# Patient Record
Sex: FEMALE | Race: WHITE | NOT HISPANIC OR LATINO
[De-identification: names, ages, dates, MRNs, and addresses within clinical notes are randomized per-mention and may not be internally consistent; named-entity substitution may affect disease eponyms.]

---

## 2019-03-21 ENCOUNTER — APPOINTMENT (OUTPATIENT)
Dept: OBGYN | Facility: CLINIC | Age: 79
End: 2019-03-21
Payer: MEDICARE

## 2019-03-21 VITALS
BODY MASS INDEX: 26.52 KG/M2 | HEIGHT: 66 IN | SYSTOLIC BLOOD PRESSURE: 146 MMHG | WEIGHT: 165 LBS | DIASTOLIC BLOOD PRESSURE: 90 MMHG

## 2019-03-21 DIAGNOSIS — Z01.419 ENCOUNTER FOR GYNECOLOGICAL EXAMINATION (GENERAL) (ROUTINE) W/OUT ABNORMAL FINDINGS: ICD-10-CM

## 2019-03-21 PROCEDURE — G0101: CPT

## 2019-03-21 PROCEDURE — 99203 OFFICE O/P NEW LOW 30 MIN: CPT | Mod: 25

## 2019-03-21 NOTE — PHYSICAL EXAM
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

## 2019-03-26 LAB — CYTOLOGY CVX/VAG DOC THIN PREP: NORMAL

## 2020-02-07 ENCOUNTER — APPOINTMENT (OUTPATIENT)
Dept: OBGYN | Facility: CLINIC | Age: 80
End: 2020-02-07
Payer: MEDICARE

## 2020-02-07 VITALS
HEART RATE: 77 BPM | HEIGHT: 66 IN | SYSTOLIC BLOOD PRESSURE: 139 MMHG | DIASTOLIC BLOOD PRESSURE: 86 MMHG | WEIGHT: 171 LBS | BODY MASS INDEX: 27.48 KG/M2

## 2020-02-07 PROCEDURE — 99213 OFFICE O/P EST LOW 20 MIN: CPT

## 2020-02-07 RX ORDER — CONJUGATED ESTROGENS 0.62 MG/G
0.62 CREAM VAGINAL
Qty: 1 | Refills: 3 | Status: ACTIVE | COMMUNITY
Start: 2020-02-07 | End: 1900-01-01

## 2020-02-07 NOTE — PHYSICAL EXAM
[Normal] : vagina [Cystocele] : a cystocele [No Bleeding] : there was no active vaginal bleeding [Absent] : absent [FreeTextEntry4] : vault prolapse

## 2020-02-25 ENCOUNTER — APPOINTMENT (OUTPATIENT)
Age: 80
End: 2020-02-25
Payer: MEDICARE

## 2020-02-25 VITALS
WEIGHT: 170 LBS | HEIGHT: 66 IN | SYSTOLIC BLOOD PRESSURE: 179 MMHG | BODY MASS INDEX: 27.32 KG/M2 | DIASTOLIC BLOOD PRESSURE: 95 MMHG

## 2020-02-25 DIAGNOSIS — N81.9 FEMALE GENITAL PROLAPSE, UNSPECIFIED: ICD-10-CM

## 2020-02-25 DIAGNOSIS — Z86.018 PERSONAL HISTORY OF OTHER BENIGN NEOPLASM: ICD-10-CM

## 2020-02-25 DIAGNOSIS — R35.1 NOCTURIA: ICD-10-CM

## 2020-02-25 DIAGNOSIS — Z87.828 PERSONAL HISTORY OF OTHER (HEALED) PHYSICAL INJURY AND TRAUMA: ICD-10-CM

## 2020-02-25 DIAGNOSIS — Z86.59 PERSONAL HISTORY OF OTHER MENTAL AND BEHAVIORAL DISORDERS: ICD-10-CM

## 2020-02-25 DIAGNOSIS — N81.4 UTEROVAGINAL PROLAPSE, UNSPECIFIED: ICD-10-CM

## 2020-02-25 DIAGNOSIS — N36.41 HYPERMOBILITY OF URETHRA: ICD-10-CM

## 2020-02-25 LAB
BILIRUB UR QL STRIP: NEGATIVE
CLARITY UR: CLEAR
COLLECTION METHOD: NORMAL
GLUCOSE UR-MCNC: NEGATIVE
HCG UR QL: 0.2 EU/DL
HGB UR QL STRIP.AUTO: NEGATIVE
KETONES UR-MCNC: NEGATIVE
LEUKOCYTE ESTERASE UR QL STRIP: NEGATIVE
NITRITE UR QL STRIP: NEGATIVE
PH UR STRIP: 7
PROT UR STRIP-MCNC: NEGATIVE
SP GR UR STRIP: 1.02

## 2020-02-25 PROCEDURE — 99215 OFFICE O/P EST HI 40 MIN: CPT

## 2020-02-25 PROCEDURE — 51701 INSERT BLADDER CATHETER: CPT

## 2020-02-25 PROCEDURE — 81003 URINALYSIS AUTO W/O SCOPE: CPT | Mod: QW

## 2020-02-25 PROCEDURE — 99204 OFFICE O/P NEW MOD 45 MIN: CPT

## 2020-02-25 RX ORDER — ESCITALOPRAM OXALATE 5 MG/1
5 TABLET ORAL
Qty: 30 | Refills: 0 | Status: ACTIVE | COMMUNITY
Start: 2019-11-08

## 2020-02-25 RX ORDER — SODIUM SULFATE, POTASSIUM SULFATE, MAGNESIUM SULFATE 17.5; 3.13; 1.6 G/ML; G/ML; G/ML
17.5-3.13-1.6 SOLUTION, CONCENTRATE ORAL
Qty: 354 | Refills: 0 | Status: ACTIVE | COMMUNITY
Start: 2020-02-04

## 2020-02-25 RX ORDER — AMOXICILLIN 500 MG/1
500 TABLET, FILM COATED ORAL
Qty: 20 | Refills: 0 | Status: ACTIVE | COMMUNITY
Start: 2019-09-06

## 2020-02-25 NOTE — OB HISTORY
[Definite ___ (Date)] : the last menstrual period was [unfilled] [Vaginal ___] : [unfilled] vaginal delivery(s) [Last Pap Smear ___] : date of last pap smear was on [unfilled] [Sexually Active] : is not sexually active [Regular Cycle Intervals] : periods have been irregular

## 2020-02-25 NOTE — HISTORY OF PRESENT ILLNESS
[FreeTextEntry1] : \par 78 y/o presents with vaginal bulge X 1 yr, becoming increasing bothersome, denies vaginal bleeding, denies splinting, denies incontinence, feels like she is sitting on a ball, denies frequency, denies urgency, denies nocturia, denies slow stream, denies incomplete emptying, denies constipation, denies UTIs, denies hematuria, denies fecal incontinence\par she was given estrace cream by Dr. Cat, did not use \par \par PSH:  hysterectomy for fibroids, laparoscopic cholecystectomy\par

## 2020-02-25 NOTE — PHYSICAL EXAM
[Well developed] : well developed [No Acute Distress] : in no acute distress [Well Nourished] : ~L well nourished [Oriented x3] : oriented to person, place, and time [Normal Mood/Affect] : mood and affect are normal [Normal Memory] : ~T memory was ~L unimpaired [No Edema] : ~T edema was not present [Suprapubic] : in the suprapubic area [Scar] : a scar was noted [Vulvar Atrophy] : vulvar atrophy [Labia Majora] : were normal [Labia Minora] : were normal [No Bleeding] : there was no active vaginal bleeding [Atrophy] : atrophy [2] : 2 [Aa ____] : Aa [unfilled] [GH ____] : GH [unfilled] [Ba ____] : Ba [unfilled] [C ____] : C [unfilled] [TVL ____] : TVL  [unfilled] [PB ____] : PB [unfilled] [Bp ____] : Bp [unfilled] [Ap ____] : Ap [unfilled] [D ____] : D [unfilled] [Post Void Residual ____ml] : post void residual via catheterization was [unfilled] ml [Normal] : no abnormalities [Adnexa Absent] : absent bilaterally [Absent] : absent [Cough] : no cough [Tenderness] : ~T no ~M abdominal tenderness observed [Distended] : not distended [FreeTextEntry3] : neg CST

## 2020-02-25 NOTE — DISCUSSION/SUMMARY
[FreeTextEntry1] : \par Mary presents with post hysterectomy vaginal vault, elevated PVR on exam today, negative CST. Visual illustrations were used to demonstrate prolapse. We reviewed management options for her prolapse including: observation, pelvic floor exercises with and without physical therapy, pessary, and surgical management. We reviewed the different types of surgical procedures including abdominal, vaginal, and robotic/laparoscopic procedures.  Mesh and non-mesh options were reviewed. IUGA information on prolapse and PFE was given to her. She is interested in robotic sacral colpopexy. U/A negative. Recommend UDS and cysto. All questions were answered.\par \par [] UDS\par [] cysto\par [] discussed using estrace cream given by Dr. Cat\par [] double voiding\par [] anticipate robotic sacral colpopexy, cysto, possible sling pending UDS\par \par

## 2020-03-18 ENCOUNTER — APPOINTMENT (OUTPATIENT)
Dept: UROGYNECOLOGY | Facility: CLINIC | Age: 80
End: 2020-03-18

## 2020-03-26 ENCOUNTER — APPOINTMENT (OUTPATIENT)
Dept: UROGYNECOLOGY | Facility: CLINIC | Age: 80
End: 2020-03-26

## 2020-09-15 ENCOUNTER — EMERGENCY (EMERGENCY)
Facility: HOSPITAL | Age: 80
LOS: 1 days | Discharge: HOME | End: 2020-09-15
Attending: EMERGENCY MEDICINE
Payer: MEDICARE

## 2020-09-15 VITALS
RESPIRATION RATE: 18 BRPM | HEIGHT: 65 IN | WEIGHT: 164.91 LBS | DIASTOLIC BLOOD PRESSURE: 86 MMHG | SYSTOLIC BLOOD PRESSURE: 186 MMHG | HEART RATE: 98 BPM | OXYGEN SATURATION: 95 % | TEMPERATURE: 101 F

## 2020-09-15 LAB
ALBUMIN SERPL ELPH-MCNC: 4 G/DL — SIGNIFICANT CHANGE UP (ref 3.3–5.2)
ALP SERPL-CCNC: 77 U/L — SIGNIFICANT CHANGE UP (ref 40–120)
ALT FLD-CCNC: 13 U/L — SIGNIFICANT CHANGE UP
ANION GAP SERPL CALC-SCNC: 14 MMOL/L — SIGNIFICANT CHANGE UP (ref 5–17)
APPEARANCE UR: CLEAR — SIGNIFICANT CHANGE UP
APTT BLD: 29.2 SEC — SIGNIFICANT CHANGE UP (ref 27.5–35.5)
AST SERPL-CCNC: 19 U/L — SIGNIFICANT CHANGE UP
BACTERIA # UR AUTO: ABNORMAL
BASOPHILS # BLD AUTO: 0.03 K/UL — SIGNIFICANT CHANGE UP (ref 0–0.2)
BASOPHILS NFR BLD AUTO: 0.4 % — SIGNIFICANT CHANGE UP (ref 0–2)
BILIRUB SERPL-MCNC: 0.4 MG/DL — SIGNIFICANT CHANGE UP (ref 0.4–2)
BILIRUB UR-MCNC: NEGATIVE — SIGNIFICANT CHANGE UP
BUN SERPL-MCNC: 7 MG/DL — LOW (ref 8–20)
CALCIUM SERPL-MCNC: 9.4 MG/DL — SIGNIFICANT CHANGE UP (ref 8.6–10.2)
CHLORIDE SERPL-SCNC: 100 MMOL/L — SIGNIFICANT CHANGE UP (ref 98–107)
CO2 SERPL-SCNC: 23 MMOL/L — SIGNIFICANT CHANGE UP (ref 22–29)
COLOR SPEC: YELLOW — SIGNIFICANT CHANGE UP
CREAT SERPL-MCNC: 0.62 MG/DL — SIGNIFICANT CHANGE UP (ref 0.5–1.3)
DIFF PNL FLD: NEGATIVE — SIGNIFICANT CHANGE UP
EOSINOPHIL # BLD AUTO: 0.14 K/UL — SIGNIFICANT CHANGE UP (ref 0–0.5)
EOSINOPHIL NFR BLD AUTO: 1.9 % — SIGNIFICANT CHANGE UP (ref 0–6)
EPI CELLS # UR: ABNORMAL
GLUCOSE SERPL-MCNC: 99 MG/DL — SIGNIFICANT CHANGE UP (ref 70–99)
GLUCOSE UR QL: NEGATIVE MG/DL — SIGNIFICANT CHANGE UP
HCT VFR BLD CALC: 43.4 % — SIGNIFICANT CHANGE UP (ref 34.5–45)
HGB BLD-MCNC: 14.2 G/DL — SIGNIFICANT CHANGE UP (ref 11.5–15.5)
IMM GRANULOCYTES NFR BLD AUTO: 0.4 % — SIGNIFICANT CHANGE UP (ref 0–1.5)
INR BLD: 1.19 RATIO — HIGH (ref 0.88–1.16)
KETONES UR-MCNC: ABNORMAL
LACTATE BLDV-MCNC: 1.2 MMOL/L — SIGNIFICANT CHANGE UP (ref 0.5–2)
LEUKOCYTE ESTERASE UR-ACNC: ABNORMAL
LYMPHOCYTES # BLD AUTO: 0.95 K/UL — LOW (ref 1–3.3)
LYMPHOCYTES # BLD AUTO: 13 % — SIGNIFICANT CHANGE UP (ref 13–44)
MCHC RBC-ENTMCNC: 29.8 PG — SIGNIFICANT CHANGE UP (ref 27–34)
MCHC RBC-ENTMCNC: 32.7 GM/DL — SIGNIFICANT CHANGE UP (ref 32–36)
MCV RBC AUTO: 91.2 FL — SIGNIFICANT CHANGE UP (ref 80–100)
MONOCYTES # BLD AUTO: 0.63 K/UL — SIGNIFICANT CHANGE UP (ref 0–0.9)
MONOCYTES NFR BLD AUTO: 8.6 % — SIGNIFICANT CHANGE UP (ref 2–14)
NEUTROPHILS # BLD AUTO: 5.55 K/UL — SIGNIFICANT CHANGE UP (ref 1.8–7.4)
NEUTROPHILS NFR BLD AUTO: 75.7 % — SIGNIFICANT CHANGE UP (ref 43–77)
NITRITE UR-MCNC: NEGATIVE — SIGNIFICANT CHANGE UP
PH UR: 7 — SIGNIFICANT CHANGE UP (ref 5–8)
PLATELET # BLD AUTO: 259 K/UL — SIGNIFICANT CHANGE UP (ref 150–400)
POTASSIUM SERPL-MCNC: 3.7 MMOL/L — SIGNIFICANT CHANGE UP (ref 3.5–5.3)
POTASSIUM SERPL-SCNC: 3.7 MMOL/L — SIGNIFICANT CHANGE UP (ref 3.5–5.3)
PROT SERPL-MCNC: 7.6 G/DL — SIGNIFICANT CHANGE UP (ref 6.6–8.7)
PROT UR-MCNC: 15 MG/DL
PROTHROM AB SERPL-ACNC: 13.7 SEC — HIGH (ref 10.6–13.6)
RBC # BLD: 4.76 M/UL — SIGNIFICANT CHANGE UP (ref 3.8–5.2)
RBC # FLD: 12.8 % — SIGNIFICANT CHANGE UP (ref 10.3–14.5)
RBC CASTS # UR COMP ASSIST: SIGNIFICANT CHANGE UP /HPF (ref 0–4)
SODIUM SERPL-SCNC: 137 MMOL/L — SIGNIFICANT CHANGE UP (ref 135–145)
SP GR SPEC: 1 — LOW (ref 1.01–1.02)
UROBILINOGEN FLD QL: 1 MG/DL
WBC # BLD: 7.33 K/UL — SIGNIFICANT CHANGE UP (ref 3.8–10.5)
WBC # FLD AUTO: 7.33 K/UL — SIGNIFICANT CHANGE UP (ref 3.8–10.5)
WBC UR QL: SIGNIFICANT CHANGE UP

## 2020-09-15 PROCEDURE — 93010 ELECTROCARDIOGRAM REPORT: CPT

## 2020-09-15 PROCEDURE — 71045 X-RAY EXAM CHEST 1 VIEW: CPT | Mod: 26

## 2020-09-15 PROCEDURE — 99218: CPT | Mod: GC

## 2020-09-15 RX ORDER — SODIUM CHLORIDE 9 MG/ML
2300 INJECTION INTRAMUSCULAR; INTRAVENOUS; SUBCUTANEOUS ONCE
Refills: 0 | Status: COMPLETED | OUTPATIENT
Start: 2020-09-15 | End: 2020-09-15

## 2020-09-15 RX ORDER — ACETAMINOPHEN 500 MG
650 TABLET ORAL ONCE
Refills: 0 | Status: COMPLETED | OUTPATIENT
Start: 2020-09-15 | End: 2020-09-15

## 2020-09-15 RX ORDER — CEFTRIAXONE 500 MG/1
1000 INJECTION, POWDER, FOR SOLUTION INTRAMUSCULAR; INTRAVENOUS ONCE
Refills: 0 | Status: COMPLETED | OUTPATIENT
Start: 2020-09-15 | End: 2020-09-15

## 2020-09-15 RX ORDER — AZITHROMYCIN 500 MG/1
500 TABLET, FILM COATED ORAL ONCE
Refills: 0 | Status: COMPLETED | OUTPATIENT
Start: 2020-09-15 | End: 2020-09-15

## 2020-09-15 RX ORDER — IBUPROFEN 200 MG
600 TABLET ORAL ONCE
Refills: 0 | Status: COMPLETED | OUTPATIENT
Start: 2020-09-15 | End: 2020-09-15

## 2020-09-15 RX ADMIN — Medication 600 MILLIGRAM(S): at 21:02

## 2020-09-15 RX ADMIN — Medication 650 MILLIGRAM(S): at 17:55

## 2020-09-15 RX ADMIN — CEFTRIAXONE 100 MILLIGRAM(S): 500 INJECTION, POWDER, FOR SOLUTION INTRAMUSCULAR; INTRAVENOUS at 17:52

## 2020-09-15 RX ADMIN — SODIUM CHLORIDE 2300 MILLILITER(S): 9 INJECTION INTRAMUSCULAR; INTRAVENOUS; SUBCUTANEOUS at 17:52

## 2020-09-15 RX ADMIN — AZITHROMYCIN 255 MILLIGRAM(S): 500 TABLET, FILM COATED ORAL at 21:03

## 2020-09-15 NOTE — ED ADULT NURSE NOTE - CAS ELECT INFOMATION PROVIDED
DC instructions/DC instructions provided to the patient. Patient in understanding of all dc instructions. No further questions for the RN regarding dc instructions. VSS. Ambulatory.

## 2020-09-15 NOTE — ED ADULT TRIAGE NOTE - ARRIVAL FROM
Limit your daily intake of fluids to 1500ml. Split the amount out so that you have some fluids through out the day.  
Home

## 2020-09-15 NOTE — ED PROVIDER NOTE - NS ED ATTENDING STATEMENT MOD
Oriented - self; Oriented - place; Oriented - time Attending Only I have personally seen and examined this patient.  I have fully participated in the care of this patient. I have reviewed all pertinent clinical information, including history, physical exam, plan and the Resident’s note and agree except as noted.

## 2020-09-15 NOTE — ED ADULT TRIAGE NOTE - CHIEF COMPLAINT QUOTE
Pt A&Ox4 states "I have had a fever for past 5 days and think I have a UTI." Patient is having symptoms for week and half, hx of prolapsed bladder supposed to have repaired in April, for past week feeling dizzy and headache.

## 2020-09-15 NOTE — ED PROVIDER NOTE - PROGRESS NOTE DETAILS
As per sign-out from Dr. Cunningham, patient pending UA and placement in obs due to symptoms for sirs. Reviewed all results with pt as well as plans for observation admission. Pt is comfortable with plan for obs stay. Questions answered. - Bon Garcia, PGY-2

## 2020-09-15 NOTE — ED PROVIDER NOTE - PHYSICAL EXAMINATION
Gen: NAD, AOx3  Head: NCAT  Lung: CTAB, no respiratory distress, no wheezing, rales, rhonchi  CV: normal s1/s2, rrr, no murmurs, Normal perfusion  Abd: soft, NTND, no CVA tenderness  MSK: No edema, no visible deformities, full range of motion in all 4 extremities  Neuro: No focal neurologic deficits  Skin: No rash   Psych: normal affect

## 2020-09-15 NOTE — ED PROVIDER NOTE - ATTENDING CONTRIBUTION TO CARE
HPI/ROS/PE written by me.    80yo F with h/o bladder prolapse p/w generalized malaise, nausea, fever- found to have SIRS. Will look for source- otain Labs, cultures, COVID, UA, cxr ordered, antibiotics and fluids given, will reassess. Diane Cunningham DO         I performed a history and physical exam of the patient and discussed their management with the resident. I reviewed the resident's note and agree with the documented findings and plan of care. My medical decision making and observations are found above.

## 2020-09-15 NOTE — ED ADULT TRIAGE NOTE - NS ED NURSE DIRECT TO ROOM YN
RN tried multiple times to obtain an [place]  on [employer] cart but unable to do so. Called pt's son and asked him to come to bedside as pt very agitated and confused. Pt's son came to bedside and calmed down pt and interpreted for her. See Waiver of  in chart.
Yes

## 2020-09-15 NOTE — ED PROVIDER NOTE - OBJECTIVE STATEMENT
78yo F with PMH bladder prolapse presenting with generalized weakness, lightheadedness, and fever. Went to urgent care today- was told to come to emergency department because her vital signs were consistent with sepsis, reportedly had positive UA. Patient endorsing mild headache but denies neck stiffness, rash, cough, shortness of breath. +Nausea, denies vomiting or diarrhea.

## 2020-09-16 VITALS
RESPIRATION RATE: 17 BRPM | DIASTOLIC BLOOD PRESSURE: 72 MMHG | SYSTOLIC BLOOD PRESSURE: 155 MMHG | OXYGEN SATURATION: 96 % | HEART RATE: 98 BPM | TEMPERATURE: 99 F

## 2020-09-16 LAB
ALBUMIN SERPL ELPH-MCNC: 3.5 G/DL — SIGNIFICANT CHANGE UP (ref 3.3–5.2)
ALP SERPL-CCNC: 68 U/L — SIGNIFICANT CHANGE UP (ref 40–120)
ALT FLD-CCNC: 11 U/L — SIGNIFICANT CHANGE UP
ANION GAP SERPL CALC-SCNC: 12 MMOL/L — SIGNIFICANT CHANGE UP (ref 5–17)
AST SERPL-CCNC: 13 U/L — SIGNIFICANT CHANGE UP
BASOPHILS # BLD AUTO: 0.04 K/UL — SIGNIFICANT CHANGE UP (ref 0–0.2)
BASOPHILS NFR BLD AUTO: 0.6 % — SIGNIFICANT CHANGE UP (ref 0–2)
BILIRUB SERPL-MCNC: 0.3 MG/DL — LOW (ref 0.4–2)
BUN SERPL-MCNC: 6 MG/DL — LOW (ref 8–20)
CALCIUM SERPL-MCNC: 9 MG/DL — SIGNIFICANT CHANGE UP (ref 8.6–10.2)
CHLORIDE SERPL-SCNC: 106 MMOL/L — SIGNIFICANT CHANGE UP (ref 98–107)
CO2 SERPL-SCNC: 24 MMOL/L — SIGNIFICANT CHANGE UP (ref 22–29)
CREAT SERPL-MCNC: 0.59 MG/DL — SIGNIFICANT CHANGE UP (ref 0.5–1.3)
CULTURE RESULTS: SIGNIFICANT CHANGE UP
EOSINOPHIL # BLD AUTO: 0.33 K/UL — SIGNIFICANT CHANGE UP (ref 0–0.5)
EOSINOPHIL NFR BLD AUTO: 4.6 % — SIGNIFICANT CHANGE UP (ref 0–6)
GLUCOSE SERPL-MCNC: 104 MG/DL — HIGH (ref 70–99)
HCT VFR BLD CALC: 41 % — SIGNIFICANT CHANGE UP (ref 34.5–45)
HGB BLD-MCNC: 13.3 G/DL — SIGNIFICANT CHANGE UP (ref 11.5–15.5)
IMM GRANULOCYTES NFR BLD AUTO: 0.4 % — SIGNIFICANT CHANGE UP (ref 0–1.5)
LACTATE BLDV-MCNC: 1 MMOL/L — SIGNIFICANT CHANGE UP (ref 0.5–2)
LYMPHOCYTES # BLD AUTO: 0.82 K/UL — LOW (ref 1–3.3)
LYMPHOCYTES # BLD AUTO: 11.5 % — LOW (ref 13–44)
MCHC RBC-ENTMCNC: 29.2 PG — SIGNIFICANT CHANGE UP (ref 27–34)
MCHC RBC-ENTMCNC: 32.4 GM/DL — SIGNIFICANT CHANGE UP (ref 32–36)
MCV RBC AUTO: 90.1 FL — SIGNIFICANT CHANGE UP (ref 80–100)
MONOCYTES # BLD AUTO: 0.81 K/UL — SIGNIFICANT CHANGE UP (ref 0–0.9)
MONOCYTES NFR BLD AUTO: 11.4 % — SIGNIFICANT CHANGE UP (ref 2–14)
NEUTROPHILS # BLD AUTO: 5.07 K/UL — SIGNIFICANT CHANGE UP (ref 1.8–7.4)
NEUTROPHILS NFR BLD AUTO: 71.5 % — SIGNIFICANT CHANGE UP (ref 43–77)
PLATELET # BLD AUTO: 209 K/UL — SIGNIFICANT CHANGE UP (ref 150–400)
POTASSIUM SERPL-MCNC: 4.1 MMOL/L — SIGNIFICANT CHANGE UP (ref 3.5–5.3)
POTASSIUM SERPL-SCNC: 4.1 MMOL/L — SIGNIFICANT CHANGE UP (ref 3.5–5.3)
PROT SERPL-MCNC: 6.3 G/DL — LOW (ref 6.6–8.7)
RBC # BLD: 4.55 M/UL — SIGNIFICANT CHANGE UP (ref 3.8–5.2)
RBC # FLD: 12.7 % — SIGNIFICANT CHANGE UP (ref 10.3–14.5)
SODIUM SERPL-SCNC: 142 MMOL/L — SIGNIFICANT CHANGE UP (ref 135–145)
SPECIMEN SOURCE: SIGNIFICANT CHANGE UP
WBC # BLD: 7.1 K/UL — SIGNIFICANT CHANGE UP (ref 3.8–10.5)
WBC # FLD AUTO: 7.1 K/UL — SIGNIFICANT CHANGE UP (ref 3.8–10.5)

## 2020-09-16 PROCEDURE — 83605 ASSAY OF LACTIC ACID: CPT

## 2020-09-16 PROCEDURE — 87086 URINE CULTURE/COLONY COUNT: CPT

## 2020-09-16 PROCEDURE — 81001 URINALYSIS AUTO W/SCOPE: CPT

## 2020-09-16 PROCEDURE — 99217: CPT

## 2020-09-16 PROCEDURE — 71045 X-RAY EXAM CHEST 1 VIEW: CPT

## 2020-09-16 PROCEDURE — 85730 THROMBOPLASTIN TIME PARTIAL: CPT

## 2020-09-16 PROCEDURE — 36415 COLL VENOUS BLD VENIPUNCTURE: CPT

## 2020-09-16 PROCEDURE — U0003: CPT

## 2020-09-16 PROCEDURE — 96374 THER/PROPH/DIAG INJ IV PUSH: CPT

## 2020-09-16 PROCEDURE — 87040 BLOOD CULTURE FOR BACTERIA: CPT

## 2020-09-16 PROCEDURE — 96375 TX/PRO/DX INJ NEW DRUG ADDON: CPT

## 2020-09-16 PROCEDURE — 85610 PROTHROMBIN TIME: CPT

## 2020-09-16 PROCEDURE — 99284 EMERGENCY DEPT VISIT MOD MDM: CPT | Mod: 25

## 2020-09-16 PROCEDURE — 85025 COMPLETE CBC W/AUTO DIFF WBC: CPT

## 2020-09-16 PROCEDURE — 93005 ELECTROCARDIOGRAM TRACING: CPT

## 2020-09-16 PROCEDURE — G0378: CPT

## 2020-09-16 PROCEDURE — 80053 COMPREHEN METABOLIC PANEL: CPT

## 2020-09-16 RX ORDER — IBUPROFEN 200 MG
600 TABLET ORAL ONCE
Refills: 0 | Status: COMPLETED | OUTPATIENT
Start: 2020-09-16 | End: 2020-09-16

## 2020-09-16 RX ORDER — ACETAMINOPHEN 500 MG
650 TABLET ORAL EVERY 6 HOURS
Refills: 0 | Status: DISCONTINUED | OUTPATIENT
Start: 2020-09-16 | End: 2020-09-20

## 2020-09-16 RX ORDER — CEFPODOXIME PROXETIL 100 MG
1 TABLET ORAL
Qty: 20 | Refills: 0
Start: 2020-09-16 | End: 2020-09-25

## 2020-09-16 RX ORDER — ONDANSETRON 8 MG/1
4 TABLET, FILM COATED ORAL EVERY 6 HOURS
Refills: 0 | Status: DISCONTINUED | OUTPATIENT
Start: 2020-09-16 | End: 2020-09-20

## 2020-09-16 RX ADMIN — Medication 600 MILLIGRAM(S): at 08:44

## 2020-09-16 NOTE — ED CDU PROVIDER DISPOSITION NOTE - PATIENT PORTAL LINK FT
You can access the FollowMyHealth Patient Portal offered by [facility] by registering at the following website: [email or web address]. By joining [employer]’s FollowMyHealth portal, you will also be able to view your health information using other applications (apps) compatible with our system.

## 2020-09-16 NOTE — PHYSICAL THERAPY INITIAL EVALUATION ADULT - DIAGNOSIS, PT EVAL
pt demonstrates independence and safety in functional mobility, no need for skilled PT at this time. PT will no longer follow.

## 2020-09-16 NOTE — ED CDU PROVIDER DISPOSITION NOTE - NSFOLLOWUPINSTRUCTIONS_ED_ALL_ED_FT
vantin 200mg by mouth 2 times a day for 10 days  tylenol or motrin for fever  follow up with doctor in 3 - 5 days

## 2020-09-16 NOTE — PROVIDER CONTACT NOTE (OTHER) - ASSESSMENT
PT consult received, chart reviewed and contents noted. Pt greeted supine in bed with NAD, willing to participate with PT. pt c/o neck pain and headache 6-7/10 pain before, during and after PT. pt completed ambulation and stair training, with no device. pt demonstrates independence and safety in functional mobility, no need for skilled PT at this time. PT will no longer follow. pt left supine in bed with all lines intact, CBWR and NAD. RN aware.

## 2020-09-16 NOTE — ED ADULT NURSE REASSESSMENT NOTE - NS ED NURSE REASSESS COMMENT FT1
Pt resting comfortably in bed. Safety maintained. Will continue to monitor.
Assumed care of the Pt AOx4. Pt in no acute distress. Pt denies SOB, VSS, medication infusing. Pt resting comfortably in bed will continue to monitor.

## 2020-09-16 NOTE — ED CDU PROVIDER INITIAL DAY NOTE - OBJECTIVE STATEMENT
80yo F with PMH bladder prolapse presenting with generalized weakness, lightheadedness, and fever. Went to urgent care today- was told to come to emergency department because her vital signs were consistent with sepsis, reportedly had positive UA. Patient endorsing mild headache but denies neck stiffness, rash, cough, shortness of breath. +Nausea, denies vomiting or diarrhea.

## 2020-09-16 NOTE — ED CDU PROVIDER INITIAL DAY NOTE - MEDICAL DECISION MAKING DETAILS
72 yo female with generalized weakness and dizziness secondary to acute PNA. Will treat with IV antibiotics and re-assess labs.

## 2020-09-16 NOTE — ED CDU PROVIDER DISPOSITION NOTE - CLINICAL COURSE
78yo female with cought x 1 day feeling weak ; sent to ed for evaluation of sepsis/uti;  pt with xray noted left perihilar pneumonia; ua with wbcs and bacterira;   pt seen by physcial therapy and cleared; pt to be discharged on vantin for pneumonia and urinary coverage;

## 2020-09-16 NOTE — PHYSICAL THERAPY INITIAL EVALUATION ADULT - ADDITIONAL COMMENTS
pt reports she [place] with her daughter [place] with [address] + 4 inside. pt reports independence with all ADLs, no need for assistive device, drives and working PTA.

## 2020-09-20 LAB
CULTURE RESULTS: SIGNIFICANT CHANGE UP
CULTURE RESULTS: SIGNIFICANT CHANGE UP
SPECIMEN SOURCE: SIGNIFICANT CHANGE UP
SPECIMEN SOURCE: SIGNIFICANT CHANGE UP

## 2021-10-19 NOTE — PHYSICAL THERAPY INITIAL EVALUATION ADULT - HEALTH SCREEN CRITERIA
[Disease: _____________________] : Disease: [unfilled] [AJCC Stage: ____] : AJCC Stage: [unfilled] [Treatment Protocol] : Treatment Protocol [de-identified] : 63 y.o female with newly diagnosed EMCA, S/P surgical staging, here with her  to discuss adjuvant treatment.\par \par Patient began to experience postmenopausal bleeding x 1 month, for which she saw Dr. Parker, with subsequent ultrasound revealing EML = 3.3 cm.\par \par She underwent D&C 5/20/21 revealing grade 1 endometrial cancer, as below: endometrioid adenocarcinoma, FIGO grade 1, in a background of complex atypical hyperplasia with secretory change. \par \par After this she was referred to Dr. Kaur for further management. On 7/8/21 she had RTLH/BSO, SLND done, pathology noting DOI 80%, +INGRID involvement, cervix negative, 0/2 LN negative, fallopian tube mucosa involved, MS stable, washings negative.\par  She now presents for consideration of adjuvant chemotherapy.\par \par Today she feels well. Denies any vaginal bleeding or discharge. Denies any change in urination or bowel movements. she had an appointment today with Dr. Guan. The plan is for six cycles of C/T and EBRT.\par She started first cycle on 8/25/21.\par \par \par  [FreeTextEntry1] : Carboplatin and Taxol\par C1 - 8/25/21\par C2 - 9/15/21 [de-identified] : Patient is here for follow up after 2nd cycle of chemo.  Overall she tolerated it well, no new complaints except for mild redness around port site since Sunday, no pain, itching, draining.  Appetite is good.  She had one day of joint pain, used Tylenol.  She denies fever, chills,chest pain, SOB, abdominal pain, nausea, vomiting, diarrhea, constipation, neuropathy. yes

## 2021-12-07 NOTE — ED ADULT NURSE NOTE - NS_SISCREENINGSR_GEN_ALL_ED
Pharmacy Name:  [facility]     Pharmacy representative name: MIKEY    Pharmacy representative phone number: [phone]    What medication are you calling in regards to: GEMTESA(Vibegron)    What question does the pharmacy have: MIKEY FROM [facility] IS CALLING TO ADVISE PRIOR AUTHORIZATION WAS DONE FOR MEDICATION(Vibegron) AND CAME BACK DENIED. HE WANTED TO RELAY MESSAGE AND SEE IF PATIENT CAN TAKE SOMETHING ELSE.     Who is the provider that prescribed the medication: DR. CHACKO    Additional Notes: IF ANY FURTHER QUESTIONS, CALL [phone]  
Negative

## 2025-04-14 NOTE — ED PROVIDER NOTE - CLINICAL SUMMARY MEDICAL DECISION MAKING FREE TEXT BOX
Anesthesia Post-op Note    Patient: Wilda Leigh  Procedure(s) Performed: BRONCHOSCOPY  Anesthesia type: General    Vitals Value Taken Time   Temp 36.4 °C (97.5 °F) 04/14/25 1150   Pulse 58 04/14/25 1154   Resp 14 04/14/25 1154   SpO2 96 % 04/14/25 1154   /60 04/14/25 1154         Patient Location: Phase II  Post-op Vital Signs:stable  Level of Consciousness: participates in exam, answers questions appropriately, awake, alert and oriented  Respiratory Status: spontaneous ventilation  Cardiovascular blood pressure returned to baseline and stable  Hydration: euvolemic  Pain Management: adequately controlled  Vomiting: none  Nausea: None  Airway Patency:patent  Post-op Assessment: awake, alert, appropriately conversant, or baseline, no complications, patient tolerated procedure well and no evidence of recall      No notable events documented.                      
Pt with no medical hx p/w concern for sepsis with fever and borderline tachycardia with normal VS. Given hx of bladder prolapse and associated urinary retention probable cause of urine. WIll check labs, ua, ucx, bcx, cxr, abx, fluids, tyelnol and reassess.